# Patient Record
(demographics unavailable — no encounter records)

---

## 2025-02-27 NOTE — HISTORY OF PRESENT ILLNESS
[FreeTextEntry8] : Becca is here with a complaint of left eye irritation following 2 weeks of cold symptoms and sore throat.  She has a past medical history of ocular migraines, occasional cold sores and chronic venous insufficiency.  Was in her usual state of good health until 2 weeks ago when she developed sore throat on the right side which persisted developed into typical cold symptoms which, have now resolved. Sore throat worsened on the right side and resolved but, she then developed irritation of the surface of her left eye and thick yellow discharge.  She denies photophobia, loss of visual acuity or eye pain.  She does note that she usually does get mucocutaneous HSV flares with viral upper respiratory infections but was surprised that she did not for this latest episode.  She is a contact  lens wear.  She wears daily, disposable lenses but, stopped as soon as the eye irritation began.

## 2025-02-27 NOTE — PHYSICAL EXAM
[TextEntry] : She is well-appearing in no acute distress.  Neck is supple oral mucosa moist oropharynx is clear.  Her left upper lid appears slightly swollen and mildly tender to the touch along the tarsal surface.  There is minimally increased injection of the conjunctiva with no obvious discharge at present.  Along the tarsal surface there is 1 punctate area of erosion which looks like a resolving chalazion.  The right eye is unremarkable.  There is no preauricular lymphadenopathy palpable.  Tympanic membranes normal she does have some nodules in her left external auditory canal.  Lungs are clear bilaterally

## 2025-03-21 NOTE — PHYSICAL EXAM
[TextEntry] : There are no skin lesions.  There is no adenopathy.  She is normocephalic, atraumatic.  Conjunctivae are pink.  Sclerae are anicteric.  Pupils are equal, round, and reactive to light and accommodation.  Extraocular motion is intact.  Funduscopic exam is unremarkable.  Neck is supple.  Thyroid is not enlarged.  There are no breast masses or discharge, either erect or supine.  Chest is clear.  There is no jugular venous distention.  Pulses are 2+, equal, without bruits.  S1 is normal.  S2 is physiologically split.  There are no murmurs or gallops.  Abdomen is nontender, nondistended.  There is no hepatosplenomegaly.  There are no masses.  Bowel sounds are normal and active in all 4 quadrants.  Neurologically, she is intact.Pitting edema on the left leg none on the right with good pulses bilaterally

## 2025-03-21 NOTE — REVIEW OF SYSTEMS
[TextEntry] : Constitutional:  No Weight Change, No Fever, No Chills, No Night Sweats, No Fatigue, No Malaise ENT/Mouth:  No Hearing Changes, No Ear Pain, No Nasal Congestion, No  Sinus Pain, No Hoarseness, No sore throat, No Rhinorrhea, No Swallowing  Difficulty Eyes:  No Eye Pain, No Swelling, No Redness, No Foreign Body, No Discharge, No Vision Changes Cardiovascular:  No Chest Pain, No SOB, No PND, No Dyspnea on Exertion,  No Orthopnea, No Claudication, No Edema, No Palpitations Respiratory:  No Cough, No Sputum, No Wheezing, No Smoke Exposure, No Dyspnea Gastrointestinal:  No Nausea, No Vomiting, No Diarrhea, No  Constipation, No Pain, No Heartburn, No Anorexia, No Dysphagia, No  Hematochezia, No Melena, No Flatulence, No Jaundice Genitourinary:  No Dysmenorrhea, No DUB, No Dyspareunia, No Dysuria, No  Urinary Frequency, No Hematuria, No Urinary Incontinence, No Urgency,  No Flank Pain, No Urinary Flow Changes, No Hesitancy Musculoskeletal:  No Arthralgias, No Myalgias, No Joint Swelling, No  Joint Stiffness, No Back Pain, No Neck Pain, No Injury History Skin:  No Skin Lesions, No Pruritis, No Hair Changes, No Breast/Skin Changes, No Nipple Discharge Neuro:  No Weakness, No Numbness, No Paresthesia, No Loss of  Consciousness, No Syncope, No Dizziness, No Headache, No Coordination  Changes, No Recent Falls Psych:  No Anxiety/Panic, No Depression, No Insomnia, No Personality  Changes, No Delusions, No Rumination, No SI/HI/AH/VH, No Social Issues,  No Memory Changes, No Violence/Abuse Hx., No Eating Concerns Heme/Lymph:  No Bruising, No Bleeding, No Transfusions History, No Lymphadenopathy Endocrine:  No Polyuria, No Polydipsia, No Temperature Intolerance

## 2025-03-21 NOTE — SOCIAL HISTORY
[TextEntry] : She is a newly retired executive,  to Khris with 2 grown children, Kofi and Phodestineye  Social alcohol  No cigarettes  Exercises regularly  Stays active in community charities

## 2025-03-21 NOTE — HISTORY OF PRESENT ILLNESS
[de-identified] : She has a past medical history of ocular migraine typically these are stereotypical and develops an aura with scotoma very occasionally ends up with a headache and nausea.  We have been managing this with as needed NSAIDs or paracetamol or Sumatriptan as needed.  She has a history of chronic venous insufficiency with ankle swelling usually left greater than right this has been present since her early 20s when she developed acute leg pain and after 6 weeks of this underwent sonography.  No deep vein thrombosis was identified but it was theorized that that was likely the cause of her presenting complaint.  She had been on oral contraceptives at that time and has since discontinued that.  She is has no family history of thrombosis.  She has had no recurrence.  She had an isolated episode of iritis in 2021 this was self resolved and there was no evidence of underlying inflammatory condition.  She is up-to-date with colonoscopy done in 2022 with Dr. Ortiz due again in 10 years.  She is followed by Dr. Consuelo Salgado for gynecology she is up-to-date with gynecology visit and mammography.  Immunizations are reviewed.  Up-to-date with shingles and tetanus vaccine she has not yet had a pneumonia vaccine she gets flu and COVID vaccines annually. [FreeTextEntry1] : Becca is here for annual wellness visit generally feels well no real changes in her medical conditions.  Recently seen for an episode of conjunctivitis which resolved with ofloxacin and she is now back to using her contact lenses.

## 2025-03-21 NOTE — PAST MEDICAL HISTORY
[TextEntry] : Distant history of DVT related to oral contraceptives,  Migraine with aura,  History of iritis  Herpes labialis